# Patient Record
Sex: FEMALE | Race: ASIAN | NOT HISPANIC OR LATINO | URBAN - METROPOLITAN AREA
[De-identification: names, ages, dates, MRNs, and addresses within clinical notes are randomized per-mention and may not be internally consistent; named-entity substitution may affect disease eponyms.]

---

## 2023-02-19 ENCOUNTER — HOSPITAL ENCOUNTER (EMERGENCY)
Facility: MEDICAL CENTER | Age: 33
End: 2023-02-19
Attending: EMERGENCY MEDICINE
Payer: COMMERCIAL

## 2023-02-19 ENCOUNTER — APPOINTMENT (OUTPATIENT)
Dept: RADIOLOGY | Facility: MEDICAL CENTER | Age: 33
End: 2023-02-19
Attending: EMERGENCY MEDICINE
Payer: COMMERCIAL

## 2023-02-19 VITALS
BODY MASS INDEX: 36.8 KG/M2 | HEIGHT: 65 IN | DIASTOLIC BLOOD PRESSURE: 91 MMHG | WEIGHT: 220.9 LBS | SYSTOLIC BLOOD PRESSURE: 144 MMHG | RESPIRATION RATE: 18 BRPM | HEART RATE: 112 BPM | TEMPERATURE: 98.5 F | OXYGEN SATURATION: 93 %

## 2023-02-19 DIAGNOSIS — S99.912A INJURY OF LEFT ANKLE, INITIAL ENCOUNTER: ICD-10-CM

## 2023-02-19 PROCEDURE — 73630 X-RAY EXAM OF FOOT: CPT | Mod: LT

## 2023-02-19 PROCEDURE — 99283 EMERGENCY DEPT VISIT LOW MDM: CPT

## 2023-02-19 PROCEDURE — 73610 X-RAY EXAM OF ANKLE: CPT | Mod: LT

## 2023-02-20 NOTE — ED PROVIDER NOTES
"ED Provider Note    CHIEF COMPLAINT  Chief Complaint   Patient presents with    Ankle Injury     L ankle injury- caught on rope while sledding. Reports she thinks it is sprained.           HPI/ROS  LIMITATION TO HISTORY   Select: : None  OUTSIDE HISTORIAN(S):  None    Karin Powell is a 32 y.o. female who presents patient presents emerged part with left ankle pain.  The patient was sliding about twisted her ankle and got caught up in the rope with this leg.  She denies any injuries or pain proximal to the ankle.  She has diffuse pain around the lateral malleolus of the left ankle and over the foot.  No proximal injuries.  No numbness or tingling.  Denies any injury to her head neck chest and pelvis or other extremities.    PAST MEDICAL HISTORY   has a past medical history of Patient denies medical problems.    SURGICAL HISTORY  patient denies any surgical history    FAMILY HISTORY  History reviewed. No pertinent family history.    SOCIAL HISTORY  Social History     Tobacco Use    Smoking status: Never    Smokeless tobacco: Never   Substance and Sexual Activity    Alcohol use: Not Currently    Drug use: Not Currently    Sexual activity: Not on file       CURRENT MEDICATIONS  Home Medications       Reviewed by Marely Byers R.N. (Registered Nurse) on 02/19/23 at 1655  Med List Status: Not Addressed     Medication Last Dose Status        Patient Rah Taking any Medications                           ALLERGIES  No Known Allergies    PHYSICAL EXAM  VITAL SIGNS: BP (!) 152/88   Pulse (!) 110   Temp 36.9 °C (98.5 °F) (Temporal)   Resp 20   Ht 1.651 m (5' 5\")   Wt 100 kg (220 lb 14.4 oz)   LMP  (LMP Unknown)   SpO2 98%   BMI 36.76 kg/m²        Left lower extremity: No knee tenderness or proximal fibular tenderness.  There is tenderness over the lateral malleolus and over the anterior talofibular ligament there is also tenderness diffusely over the toes and the lateral portion of the foot.  Normal pulse, " normal perfusion, normal neurovascular exam.  Normal neurovascular examination.  No medial malleolar tenderness.    DIAGNOSTIC STUDIES / PROCEDURES      RADIOLOGY  I have independently interpreted the diagnostic imaging associated with this visit and am waiting the final reading from the radiologist.   My preliminary interpretation is a follows: No fracture or dislocation seen.  Radiologist interpretation:     DX-FOOT-COMPLETE 3+ LEFT   Final Result      No radiographic evidence of acute traumatic injury.      DX-ANKLE 3+ VIEWS LEFT   Final Result      No radiographic evidence of acute traumatic injury.            COURSE & MEDICAL DECISION MAKING    ED Observation Status? No; Patient does not meet criteria for ED Observation.     INITIAL ASSESSMENT, COURSE AND PLAN  Care Narrative: Patient presents the emergency department with an injury to her left foot and ankle.  There is no proximal fibular tenderness to suggest a proximal fibular fracture.  No knee pain or tenderness.  Denies any pain above the lower extremity.  Denies hitting her head, injuring her neck chest abdomen or pelvis.  She appears to have no other injuries.    X-rays of foot and ankle are negative.  She still quite tender and has pain with range of motion and movement.  We will put in a short walking boot and give her crutches.    Plan is rest, ice, elevation, over-the-counter analgesia and follow-up with orthopedics.    Return for worsening pain, any numbness or other concerns.    Questions were answered, she is agreeable with the plan.          DISPOSITION AND DISCUSSIONS  I have discussed management of the patient with the following physicians and MIKKI's: None    Emmanuel Vivar M.D.  [address] [place] [facility] [address]  [phone]    Schedule an appointment as soon as possible for a visit in 3 days              FINAL DIAGNOSIS  1. Injury of left ankle, initial encounter           Electronically signed by: Lv Kamara M.D., 2/19/2023  5:17 PM

## 2023-02-20 NOTE — DISCHARGE INSTRUCTIONS
Your x-rays did not show fracture.  Keep your ankle in the walking boot, use crutches.  Take over-the-counter ibuprofen or Tylenol for pain.  Follow-up with orthopedic doctor.  Return for worsening pain, for any numbness ting weakness or other concerns.